# Patient Record
Sex: FEMALE | Race: WHITE | Employment: FULL TIME | URBAN - METROPOLITAN AREA
[De-identification: names, ages, dates, MRNs, and addresses within clinical notes are randomized per-mention and may not be internally consistent; named-entity substitution may affect disease eponyms.]

---

## 2020-09-01 ENCOUNTER — TRANSFERRED RECORDS (OUTPATIENT)
Dept: MULTI SPECIALTY CLINIC | Facility: CLINIC | Age: 50
End: 2020-09-01

## 2020-09-01 LAB — PAP SMEAR - HIM PATIENT REPORTED: NEGATIVE

## 2021-04-08 ENCOUNTER — OFFICE VISIT (OUTPATIENT)
Dept: FAMILY MEDICINE | Facility: CLINIC | Age: 51
End: 2021-04-08
Payer: COMMERCIAL

## 2021-04-08 VITALS
DIASTOLIC BLOOD PRESSURE: 68 MMHG | HEART RATE: 88 BPM | RESPIRATION RATE: 16 BRPM | OXYGEN SATURATION: 99 % | WEIGHT: 156 LBS | TEMPERATURE: 97.6 F | SYSTOLIC BLOOD PRESSURE: 110 MMHG

## 2021-04-08 DIAGNOSIS — R79.89 ABNORMAL CBC: ICD-10-CM

## 2021-04-08 DIAGNOSIS — R00.0 TACHYCARDIA: Primary | ICD-10-CM

## 2021-04-08 DIAGNOSIS — R17 ELEVATED BILIRUBIN: ICD-10-CM

## 2021-04-08 LAB
ALBUMIN SERPL-MCNC: 4.2 G/DL (ref 3.4–5)
ALP SERPL-CCNC: 69 U/L (ref 40–150)
ALT SERPL W P-5'-P-CCNC: 24 U/L (ref 0–50)
ANION GAP SERPL CALCULATED.3IONS-SCNC: 9 MMOL/L (ref 3–14)
AST SERPL W P-5'-P-CCNC: 16 U/L (ref 0–45)
BILIRUB SERPL-MCNC: 1.8 MG/DL (ref 0.2–1.3)
BUN SERPL-MCNC: 21 MG/DL (ref 7–30)
CALCIUM SERPL-MCNC: 9.5 MG/DL (ref 8.5–10.1)
CHLORIDE SERPL-SCNC: 102 MMOL/L (ref 94–109)
CO2 SERPL-SCNC: 24 MMOL/L (ref 20–32)
CREAT SERPL-MCNC: 0.83 MG/DL (ref 0.52–1.04)
ERYTHROCYTE [DISTWIDTH] IN BLOOD BY AUTOMATED COUNT: 12.3 % (ref 10–15)
GFR SERPL CREATININE-BSD FRML MDRD: 81 ML/MIN/{1.73_M2}
GLUCOSE SERPL-MCNC: 81 MG/DL (ref 70–99)
HCT VFR BLD AUTO: 43.2 % (ref 35–47)
HGB BLD-MCNC: 14.8 G/DL (ref 11.7–15.7)
MAGNESIUM SERPL-MCNC: 2.2 MG/DL (ref 1.6–2.3)
MCH RBC QN AUTO: 34.3 PG (ref 26.5–33)
MCHC RBC AUTO-ENTMCNC: 34.3 G/DL (ref 31.5–36.5)
MCV RBC AUTO: 100 FL (ref 78–100)
PLATELET # BLD AUTO: 260 10E9/L (ref 150–450)
POTASSIUM SERPL-SCNC: 4.4 MMOL/L (ref 3.4–5.3)
PROT SERPL-MCNC: 7.6 G/DL (ref 6.8–8.8)
RBC # BLD AUTO: 4.32 10E12/L (ref 3.8–5.2)
SODIUM SERPL-SCNC: 135 MMOL/L (ref 133–144)
TSH SERPL DL<=0.005 MIU/L-ACNC: 2.56 MU/L (ref 0.4–4)
WBC # BLD AUTO: 3.6 10E9/L (ref 4–11)

## 2021-04-08 PROCEDURE — 85027 COMPLETE CBC AUTOMATED: CPT | Performed by: NURSE PRACTITIONER

## 2021-04-08 PROCEDURE — 84443 ASSAY THYROID STIM HORMONE: CPT | Performed by: NURSE PRACTITIONER

## 2021-04-08 PROCEDURE — 93000 ELECTROCARDIOGRAM COMPLETE: CPT | Performed by: NURSE PRACTITIONER

## 2021-04-08 PROCEDURE — 83735 ASSAY OF MAGNESIUM: CPT | Performed by: NURSE PRACTITIONER

## 2021-04-08 PROCEDURE — 99204 OFFICE O/P NEW MOD 45 MIN: CPT | Performed by: NURSE PRACTITIONER

## 2021-04-08 PROCEDURE — 36415 COLL VENOUS BLD VENIPUNCTURE: CPT | Performed by: NURSE PRACTITIONER

## 2021-04-08 PROCEDURE — 80053 COMPREHEN METABOLIC PANEL: CPT | Performed by: NURSE PRACTITIONER

## 2021-04-08 NOTE — Clinical Note
Please abstract the following data from this visit with this patient into the appropriate field in Epic:    Tests that can be patient reported without a hard copy:    Colonoscopy done on this date: 9/2019 (approximately), by this group: [email or web address], results were normal/negative. , Mammogram done on this date: 9/2020 (approximately), by this group: [facility], results were normal/negative. , and Pap smear done on this date: 9/2020 (approximately), by this group: [facility], results were normal/negative.     Other Tests found in the patient's chart through Chart Review/Care Everywhere:    {Abstract Quality List (Optional):513330}    Note to Abstraction: If this section is blank, no results were found via Chart Review/Care Everywhere.

## 2021-04-08 NOTE — PROGRESS NOTES
Assessment & Plan     Tachycardia  Comment: ECG normal today and without concerning features that would signify old or new infarct, as well as current ischemia or arrhythmia. No evidence of murmur or clicks on chest exam. Orthostatic vitals demonstrate escalation of HR without compromise of BP. Has been worked up for POTS, reportedly. Will check TSH, CBC, and electrolytes today. Will also order exercise echo to look for reason for possible reduction in stroke volume that would provoke exaggerated HR response. Will get leadless ECG, as well. No red flags today to suggest that she needs emergency attention for this. Will follow up as I receive data from the ordered studies today. Discussed reasons to call or return to clinic. Kathy acknowledges and demonstrates understanding of circumstances under which care should be sought urgently or emergently. Follow up as discussed.   - EKG 12-lead complete w/read - Clinics  - Comprehensive metabolic panel  - Magnesium  - CBC with platelets  - TSH with free T4 reflex  - Echocardiogram Exercise Stress  - Leadless EKG Monitor 3 to 7 Days      See Patient Instructions    Return in about 4 weeks (around 5/6/2021) for persistent or worsening symptoms.    Emmanuel Ramirez NP  [facility]    Ulises Chavez is a 50 year old who presents for the following health issues     HPI     Concern - Tachycardia  Onset: about a year  Description: pt has been experiencing tachycardia for about the past year.  Pt says that when she is sitting it is fine but when she stands up she feels her heart is racing.  Intensity: moderate  Progression of Symptoms:  same  Accompanying Signs & Symptoms: none  Previous history of similar problem: none  Precipitating factors:        Worsened by: none  Alleviating factors:        Improved by: none  Therapies tried and outcome:  none     HPI: Kathy presents today with the complaint of rapid (but regular) heart rate upon  standing, as well as exaggerated tachycardia while working out. This issue arose about one year ago and was initially only noted while working out. However, more recently, she has started to note dramatic heart rate escalation with simple standing. No fainting, dizziness, chest pain / pressure, shortness of breath. She works out at [employer] and finds that she has to hold back due to this issue.     On Tuesday while working out:   AVG    MAX     Very little caffeine  Stressful job  No other stimulants    Her sister has POTS  Grandfather  of early heart disease    She states that she has been worked up for POTS, as well, and did not meet diagnostic criteria. She states that her HR didn't drive up with tilt table test, but it did shoot up when she simply stood up from sitting.     She has had thyroid issues in the past that were managed with supplements and dietary changes. Has not had this rechecked recently.     Review of Systems   Constitutional, cardiovascular, pulmonary, neuro, endocrine and psych systems are negative, except as otherwise noted.      Objective    /68   Pulse 88   Temp 97.6  F (36.4  C)   Resp 16   Wt 70.8 kg (156 lb)   SpO2 99%   There is no height or weight on file to calculate BMI.  Physical Exam   GENERAL: healthy, alert and no distress  RESP: lungs clear to auscultation - no rales, rhonchi or wheezes  CV: regular rate and rhythm, normal S1 S2, no S3 or S4, no murmur, click or rub, no peripheral edema and peripheral pulses strong  NEURO: Normal strength and tone, mentation intact and speech normal  PSYCH: mentation appears normal, affect normal/bright    EKG - Reviewed and interpreted by me Normal Sinus Rhythm, normal axis, normal intervals, no acute ST/T changes c/w ischemia, no LVH by voltage criteria    Orthostatics today:    Supine  114/71  81 bpm  Sitting  107/76  99 bpm  Standing 110/76  102 bpm    No results found for this or any previous visit  (from the past 24 hour(s)).

## 2021-04-09 ENCOUNTER — TELEPHONE (OUTPATIENT)
Dept: FAMILY MEDICINE | Facility: CLINIC | Age: 51
End: 2021-04-09

## 2021-04-09 NOTE — TELEPHONE ENCOUNTER
"S/w pt and gave Michael's reply about lab results.    Pt states she has been consuming more \"covid cocktails\" than usual.  Scheduled to recheck labs on May 5th at 10:15 am.    Yessenia MUSE RN  EP Triage    "

## 2021-04-09 NOTE — TELEPHONE ENCOUNTER
----- Message from Emmanuel Ramirez NP sent at 4/9/2021  7:22 AM CDT -----  Please call Kathy and relate the following:    I have reviewed your labs.     - Your thyroid function was normal.     - Your kidney function and electrolytes (including magnesium) were normal. This helps rule out electrolyte disturbance as a cause of your fast heart rate.     - Your complete blood count did not show any evidence of anemia or infection / inflammation. Your white blood cell count was just a bit below the normal range. This is may or may not be clinically significant, and it could be caused by a number of things - some benign and some not.  Unfortunately, I don't have past lab data for you for comparison (to see if this is a new or old phenomenon). I think we should recheck this in a month or so (I will place the order, so you can just schedule a lab visit). If it is abnormal again, I will order more studies using the blood they draw (peripheral morphology, where a pathologist looks at the cells to make sure they are normal in size and appearance).     - Your liver function looked overall normal, but your bilirubin is just a bit higher than the normal range. This probably doesn't directly relate to your heart rate issue, but we should follow up on it. Like the white blood cell count issue, a higher-than-normal bilirubin is normal for some people (ie Gilbert Syndrome), but because I don't have past lab data for you, I cannot say that this is a longstanding finding for you. Elevated bilirubin can be caused by a number of things, as well, so there is more work to be done to narrow it down. It can be caused by faster turnover of red blood cells, liver issues, gall stones, and other things. I didn't get to ask you about alcohol use or Tylenol use, but these are things that can affect liver function in a way that bilirubin will be elevated. The first step is to figure out if the bilirubin is conjugated or unconjugated (by lab  study). This will help us narrow down the origin of the problem. I will order another check of this and have the  lab check if the bilirubin is conjugated or unconjugated. Based on that result, we will make a plan. We may need to do a ultrasound of your liver and gall bladder at some point if the results indicate. You can have this drawn at the same time as your repeat CBC.    I will get back to you after I get these results, and when I get your echo and ZioPatch results.     Take care,    Michael      -

## 2021-04-22 ENCOUNTER — TELEPHONE (OUTPATIENT)
Dept: FAMILY MEDICINE | Facility: CLINIC | Age: 51
End: 2021-04-22

## 2021-04-22 NOTE — TELEPHONE ENCOUNTER
Please call her back and let her know that we have three options.     1. Order regular stress test where she would walk on a treadmill and have an ECG done. This would tell us if she is having ischemia (reduced blood flow to the heart muscle) or if she develops an arrhythmia that isn't sinus in nature during exercise. It won't allow us to watch the heart function via ultrasound, however. If this is abnormal, we could order the stress echocardiogram.     2. We could just have her do the Zio Patch study first and wait for the results. If that is very abnormal, that may help us convince her insurance that she needs the stress echo.    3. We could simply have her see cardiology for further workup of this issue.     Let me know what she would like to do.     Thanks,    Michael

## 2021-04-22 NOTE — TELEPHONE ENCOUNTER
S/w pt and gave Michael's options below.  Pt states she is already scheduled for the zio patch on 5/12 and is going to go with that option at this time.    Routing to Michael as CECE MUSE RN  EP Triage

## 2021-04-22 NOTE — TELEPHONE ENCOUNTER
Pt calling stating she received a letter from her insurance company denying stress test.    Per insurance letter pt needs one of the following qualifiers for Stress Echo:     -Abnormal ECG  OR  -Unable to walk on treadmill -pt notes that her resting HR is higher than normal and that's the reason she was seen.     Please advise.     Radha Miller RN

## 2021-05-12 ENCOUNTER — HOSPITAL ENCOUNTER (OUTPATIENT)
Dept: CARDIOLOGY | Facility: CLINIC | Age: 51
Discharge: HOME | End: 2021-05-12
Attending: NURSE PRACTITIONER | Admitting: NURSE PRACTITIONER
Payer: COMMERCIAL

## 2021-05-12 DIAGNOSIS — R00.0 TACHYCARDIA: ICD-10-CM

## 2021-05-12 PROCEDURE — 93244 EXT ECG>48HR<7D REV&INTERPJ: CPT | Performed by: INTERNAL MEDICINE

## 2021-05-12 PROCEDURE — 93242 EXT ECG>48HR<7D RECORDING: CPT

## 2021-06-13 ENCOUNTER — HEALTH MAINTENANCE LETTER (OUTPATIENT)
Age: 51
End: 2021-06-13

## 2021-10-03 ENCOUNTER — HEALTH MAINTENANCE LETTER (OUTPATIENT)
Age: 51
End: 2021-10-03

## 2021-11-28 ENCOUNTER — HEALTH MAINTENANCE LETTER (OUTPATIENT)
Age: 51
End: 2021-11-28

## 2022-07-10 ENCOUNTER — HEALTH MAINTENANCE LETTER (OUTPATIENT)
Age: 52
End: 2022-07-10

## 2022-09-10 ENCOUNTER — HEALTH MAINTENANCE LETTER (OUTPATIENT)
Age: 52
End: 2022-09-10

## 2023-01-21 ENCOUNTER — HEALTH MAINTENANCE LETTER (OUTPATIENT)
Age: 53
End: 2023-01-21

## 2023-07-23 ENCOUNTER — HEALTH MAINTENANCE LETTER (OUTPATIENT)
Age: 53
End: 2023-07-23

## 2023-08-16 ENCOUNTER — PATIENT OUTREACH (OUTPATIENT)
Dept: CARE COORDINATION | Facility: CLINIC | Age: 53
End: 2023-08-16
Payer: COMMERCIAL

## 2023-08-30 ENCOUNTER — PATIENT OUTREACH (OUTPATIENT)
Dept: CARE COORDINATION | Facility: CLINIC | Age: 53
End: 2023-08-30
Payer: COMMERCIAL